# Patient Record
Sex: FEMALE | URBAN - METROPOLITAN AREA
[De-identification: names, ages, dates, MRNs, and addresses within clinical notes are randomized per-mention and may not be internally consistent; named-entity substitution may affect disease eponyms.]

---

## 2024-07-02 ENCOUNTER — APPOINTMENT (RX ONLY)
Dept: URBAN - METROPOLITAN AREA CLINIC 167 | Facility: CLINIC | Age: 24
Setting detail: DERMATOLOGY
End: 2024-07-02

## 2024-07-02 DIAGNOSIS — Z41.9 ENCOUNTER FOR PROCEDURE FOR PURPOSES OTHER THAN REMEDYING HEALTH STATE, UNSPECIFIED: ICD-10-CM

## 2024-07-02 PROCEDURE — ? ADDITIONAL NOTES

## 2024-07-02 PROCEDURE — ? COSMETIC CONSULTATION: FACIAL

## 2024-07-02 PROCEDURE — ? COSMETIC QUOTE

## 2024-07-02 NOTE — PROCEDURE: COSMETIC QUOTE
Body Procedure 4 Price/Unit (In Dollars- Use Only Numbers And Decimals): 0.00
Laser 3 Units: 0
Show Monthly Cost Breakdown: No
Face Procedure 1 Price/Unit (In Dollars- Use Only Numbers And Decimals): 179
Body Procedure 1 Price/Unit (In Dollars- Use Only Numbers And Decimals): 814
Include Sales Tax On Surgeon's Fees: Yes
Face Procedure 2: facial
Facility Fee Units (Optional): 1
Detail Level: Zone
Body Procedure 1: PRP microneedling scalp.
Face Procedure 2 Price/Unit (In Dollars- Use Only Numbers And Decimals): 175

## 2024-07-02 NOTE — PROCEDURE: ADDITIONAL NOTES
Detail Level: Simple
Render Risk Assessment In Note?: no
Additional Notes: Pt can into day , she has JU[employer] hall, acne  scars. She is seeing Mindy July 9th. I schedule for facial 3 weeks after her appt.